# Patient Record
Sex: MALE | Race: WHITE | NOT HISPANIC OR LATINO | Employment: FULL TIME | URBAN - METROPOLITAN AREA
[De-identification: names, ages, dates, MRNs, and addresses within clinical notes are randomized per-mention and may not be internally consistent; named-entity substitution may affect disease eponyms.]

---

## 2018-02-21 ENCOUNTER — CONSULT (OUTPATIENT)
Dept: UROLOGY | Facility: AMBULATORY SURGERY CENTER | Age: 48
End: 2018-02-21
Payer: COMMERCIAL

## 2018-02-21 VITALS
WEIGHT: 168.6 LBS | SYSTOLIC BLOOD PRESSURE: 122 MMHG | BODY MASS INDEX: 23.6 KG/M2 | HEART RATE: 58 BPM | DIASTOLIC BLOOD PRESSURE: 70 MMHG | HEIGHT: 71 IN

## 2018-02-21 DIAGNOSIS — N20.0 CALCULUS OF KIDNEY: Primary | ICD-10-CM

## 2018-02-21 PROCEDURE — 99244 OFF/OP CNSLTJ NEW/EST MOD 40: CPT | Performed by: UROLOGY

## 2018-02-21 RX ORDER — TAMSULOSIN HYDROCHLORIDE 0.4 MG/1
0.4 CAPSULE ORAL
Qty: 14 CAPSULE | Refills: 0 | Status: SHIPPED | OUTPATIENT
Start: 2018-02-21 | End: 2018-03-14

## 2018-02-21 NOTE — PROGRESS NOTES
2/21/2018    Neva Escalante  1970  [id number]        Assessment  Left ureteral calculus and renal calculus    Plan  Patient is tolerating his symptoms reasonably well at this time  No urgent intervention necessary  The we did discuss indications for emergent presentation including fever, severe pain, vomiting  Otherwise he will attempt another 2 weeks of stone passage  I prescribed tamsulosin to assist with this  He understands the importance of hydration and will avoid his tea in the future  He will tentatively schedule left ureteroscopy, laser, stone extraction, stent placement  We discussed the technique, risks, and benefits  Consent was obtained in the office today  He will notify us if he passes the stone  History of Present Illness  Chiqui Contreras is a 52 y o  male with multiple episodes of kidney stones in the past   He has passed these spontaneously  About 2 months ago, he noticed left flank pain which he believes was a stone  Nevertheless it was tolerable so he went on vacation to [place]  He then developed severe pain and went to emergency room on 2/10/2018  CT scan report reveals 6 mm left mid ureteral calculus at the L4-L5 level  He also has nonobstructing 5 mm calculus  He has been doing reasonably well since then  Yesterday he had some mild left groin pain and some dark urine  He continues to drink iced tea  He is also drinking lots of water  He was not prescribed tamsulosin  Review of Systems  Review of Systems   Constitutional: Negative  HENT: Negative  Respiratory: Negative  Cardiovascular: Negative  Gastrointestinal: Negative  Genitourinary:        As per HPI   Musculoskeletal: Negative  Skin: Negative  Neurological: Negative  Hematological: Negative            Past Medical History  Past Medical History:   Diagnosis Date    Crohn disease ([facility] [address] )     Kidney stone        Past Social History  Past Surgical History:   Procedure Laterality Date  ACHILLES TENDON SURGERY  2008    COLON SURGERY  1995       Past Family History  Family History   Problem Relation Age of Onset    Stroke Father     Nephrolithiasis Father     Diabetes Mother     Nephrolithiasis Brother        Past Social history  Social History     Social History    Marital status: /Civil Union     Spouse name: N/A    Number of children: N/A    Years of education: N/A     Occupational History    Not on file  Social History Main Topics    Smoking status: Never Smoker    Smokeless tobacco: Never Used    Alcohol use Yes      Comment: occasional     Drug use: No    Sexual activity: Not on file     Other Topics Concern    Not on file     Social History Narrative    No narrative on file       Current Medications  Current Outpatient Prescriptions   Medication Sig Dispense Refill    tamsulosin (FLOMAX) 0 4 mg Take 1 capsule (0 4 mg total) by mouth daily with dinner 14 capsule 0     No current facility-administered medications for this visit  Allergies  No Known Allergies    Past Medical History, Social History, Family History, medications and allergies were reviewed  Vitals  Vitals:    02/21/18 1514   BP: 122/70   Pulse: 58   Weight: 76 5 kg (168 lb 9 6 oz)   Height: 5' 11" (1 803 m)       Physical Exam  Physical Exam   Constitutional: He is oriented to person, place, and time  He appears well-developed and well-nourished  Cardiovascular: Normal rate and regular rhythm  Pulmonary/Chest: Effort normal and breath sounds normal    Abdominal: Soft  Genitourinary:   Genitourinary Comments: No CVA tenderness   Musculoskeletal: Normal range of motion  Neurological: He is alert and oriented to person, place, and time  Skin: Skin is warm, dry and intact  Psychiatric: He has a normal mood and affect  Vitals reviewed          Results  No results found for: PSA  No results found for: GLUCOSE, CALCIUM, NA, K, CO2, CL, BUN, CREATININE  No results found for: WBC, HGB, HCT, MCV, PLT

## 2018-03-01 ENCOUNTER — ANESTHESIA EVENT (OUTPATIENT)
Dept: PERIOP | Facility: AMBULARY SURGERY CENTER | Age: 48
End: 2018-03-01
Payer: COMMERCIAL

## 2018-03-06 ENCOUNTER — APPOINTMENT (OUTPATIENT)
Dept: LAB | Facility: CLINIC | Age: 48
End: 2018-03-06
Payer: COMMERCIAL

## 2018-03-06 ENCOUNTER — OFFICE VISIT (OUTPATIENT)
Dept: LAB | Facility: CLINIC | Age: 48
End: 2018-03-06
Payer: COMMERCIAL

## 2018-03-06 DIAGNOSIS — N20.0 CALCULUS OF KIDNEY: ICD-10-CM

## 2018-03-06 LAB
ANION GAP SERPL CALCULATED.3IONS-SCNC: 8 MMOL/L (ref 4–13)
APTT PPP: 33 SECONDS (ref 23–35)
BASOPHILS # BLD AUTO: 0.02 THOUSANDS/ΜL (ref 0–0.1)
BASOPHILS NFR BLD AUTO: 1 % (ref 0–1)
BUN SERPL-MCNC: 13 MG/DL (ref 5–25)
CALCIUM SERPL-MCNC: 8.6 MG/DL (ref 8.3–10.1)
CHLORIDE SERPL-SCNC: 103 MMOL/L (ref 100–108)
CO2 SERPL-SCNC: 32 MMOL/L (ref 21–32)
CREAT SERPL-MCNC: 1.13 MG/DL (ref 0.6–1.3)
EOSINOPHIL # BLD AUTO: 0.2 THOUSAND/ΜL (ref 0–0.61)
EOSINOPHIL NFR BLD AUTO: 5 % (ref 0–6)
ERYTHROCYTE [DISTWIDTH] IN BLOOD BY AUTOMATED COUNT: 13.2 % (ref 11.6–15.1)
GFR SERPL CREATININE-BSD FRML MDRD: 77 ML/MIN/1.73SQ M
GLUCOSE SERPL-MCNC: 81 MG/DL (ref 65–140)
HCT VFR BLD AUTO: 42.1 % (ref 36.5–49.3)
HGB BLD-MCNC: 14.3 G/DL (ref 12–17)
INR PPP: 0.98 (ref 0.86–1.16)
LYMPHOCYTES # BLD AUTO: 1.08 THOUSANDS/ΜL (ref 0.6–4.47)
LYMPHOCYTES NFR BLD AUTO: 28 % (ref 14–44)
MCH RBC QN AUTO: 33.3 PG (ref 26.8–34.3)
MCHC RBC AUTO-ENTMCNC: 34 G/DL (ref 31.4–37.4)
MCV RBC AUTO: 98 FL (ref 82–98)
MONOCYTES # BLD AUTO: 0.36 THOUSAND/ΜL (ref 0.17–1.22)
MONOCYTES NFR BLD AUTO: 9 % (ref 4–12)
NEUTROPHILS # BLD AUTO: 2.23 THOUSANDS/ΜL (ref 1.85–7.62)
NEUTS SEG NFR BLD AUTO: 57 % (ref 43–75)
PLATELET # BLD AUTO: 226 THOUSANDS/UL (ref 149–390)
PMV BLD AUTO: 9.1 FL (ref 8.9–12.7)
POTASSIUM SERPL-SCNC: 4.5 MMOL/L (ref 3.5–5.3)
PROTHROMBIN TIME: 13.3 SECONDS (ref 12.1–14.4)
RBC # BLD AUTO: 4.29 MILLION/UL (ref 3.88–5.62)
SODIUM SERPL-SCNC: 143 MMOL/L (ref 136–145)
WBC # BLD AUTO: 3.89 THOUSAND/UL (ref 4.31–10.16)

## 2018-03-06 PROCEDURE — 87086 URINE CULTURE/COLONY COUNT: CPT

## 2018-03-06 PROCEDURE — 93005 ELECTROCARDIOGRAM TRACING: CPT

## 2018-03-06 PROCEDURE — 80048 BASIC METABOLIC PNL TOTAL CA: CPT

## 2018-03-06 PROCEDURE — 85025 COMPLETE CBC W/AUTO DIFF WBC: CPT

## 2018-03-06 PROCEDURE — 85730 THROMBOPLASTIN TIME PARTIAL: CPT

## 2018-03-06 PROCEDURE — 85610 PROTHROMBIN TIME: CPT

## 2018-03-06 PROCEDURE — 36415 COLL VENOUS BLD VENIPUNCTURE: CPT

## 2018-03-06 NOTE — PRE-PROCEDURE INSTRUCTIONS
Pre-Surgery Instructions:   Medication Instructions    tamsulosin (FLOMAX) 0 4 mg Instructed patient per Anesthesia Guidelines  Pre op and bathing instructions reviewed

## 2018-03-07 LAB
ATRIAL RATE: 60 BPM
BACTERIA UR CULT: ABNORMAL
P AXIS: 51 DEGREES
PR INTERVAL: 134 MS
QRS AXIS: 42 DEGREES
QRSD INTERVAL: 94 MS
QT INTERVAL: 396 MS
QTC INTERVAL: 396 MS
T WAVE AXIS: 24 DEGREES
VENTRICULAR RATE: 60 BPM

## 2018-03-07 PROCEDURE — 93010 ELECTROCARDIOGRAM REPORT: CPT | Performed by: INTERNAL MEDICINE

## 2018-03-14 ENCOUNTER — HOSPITAL ENCOUNTER (EMERGENCY)
Facility: HOSPITAL | Age: 48
Discharge: HOME | End: 2018-03-14
Attending: EMERGENCY MEDICINE | Admitting: EMERGENCY MEDICINE
Payer: COMMERCIAL

## 2018-03-14 ENCOUNTER — ANESTHESIA (OUTPATIENT)
Dept: PERIOP | Facility: AMBULARY SURGERY CENTER | Age: 48
End: 2018-03-14
Payer: COMMERCIAL

## 2018-03-14 ENCOUNTER — HOSPITAL ENCOUNTER (OUTPATIENT)
Facility: AMBULARY SURGERY CENTER | Age: 48
Setting detail: OUTPATIENT SURGERY
Discharge: HOME | End: 2018-03-14
Attending: UROLOGY | Admitting: UROLOGY
Payer: COMMERCIAL

## 2018-03-14 ENCOUNTER — APPOINTMENT (OUTPATIENT)
Dept: RADIOLOGY | Facility: AMBULARY SURGERY CENTER | Age: 48
End: 2018-03-14
Payer: COMMERCIAL

## 2018-03-14 ENCOUNTER — TELEPHONE (OUTPATIENT)
Dept: UROLOGY | Facility: AMBULATORY SURGERY CENTER | Age: 48
End: 2018-03-14

## 2018-03-14 VITALS
RESPIRATION RATE: 16 BRPM | SYSTOLIC BLOOD PRESSURE: 111 MMHG | DIASTOLIC BLOOD PRESSURE: 71 MMHG | BODY MASS INDEX: 23.52 KG/M2 | HEART RATE: 73 BPM | OXYGEN SATURATION: 97 % | WEIGHT: 168 LBS | HEIGHT: 71 IN | TEMPERATURE: 98.2 F

## 2018-03-14 VITALS
RESPIRATION RATE: 16 BRPM | BODY MASS INDEX: 23.59 KG/M2 | TEMPERATURE: 97.4 F | DIASTOLIC BLOOD PRESSURE: 84 MMHG | HEART RATE: 56 BPM | OXYGEN SATURATION: 98 % | HEIGHT: 71 IN | SYSTOLIC BLOOD PRESSURE: 120 MMHG | WEIGHT: 168.5 LBS

## 2018-03-14 DIAGNOSIS — N20.0 CALCULUS OF KIDNEY: Primary | ICD-10-CM

## 2018-03-14 DIAGNOSIS — N20.0 NEPHROLITHIASIS: Primary | ICD-10-CM

## 2018-03-14 DIAGNOSIS — R10.9 LEFT FLANK PAIN: Primary | ICD-10-CM

## 2018-03-14 LAB
ANION GAP SERPL CALCULATED.3IONS-SCNC: 7 MMOL/L (ref 4–13)
BACTERIA UR QL AUTO: ABNORMAL /HPF
BILIRUB UR QL STRIP: NEGATIVE
BUN SERPL-MCNC: 13 MG/DL (ref 5–25)
CALCIUM SERPL-MCNC: 9.1 MG/DL (ref 8.3–10.1)
CHLORIDE SERPL-SCNC: 104 MMOL/L (ref 100–108)
CLARITY UR: ABNORMAL
CO2 SERPL-SCNC: 29 MMOL/L (ref 21–32)
COLOR UR: ABNORMAL
CREAT SERPL-MCNC: 1.18 MG/DL (ref 0.6–1.3)
GFR SERPL CREATININE-BSD FRML MDRD: 73 ML/MIN/1.73SQ M
GLUCOSE SERPL-MCNC: 130 MG/DL (ref 65–140)
GLUCOSE UR STRIP-MCNC: NEGATIVE MG/DL
HGB UR QL STRIP.AUTO: ABNORMAL
KETONES UR STRIP-MCNC: NEGATIVE MG/DL
LEUKOCYTE ESTERASE UR QL STRIP: ABNORMAL
NITRITE UR QL STRIP: NEGATIVE
NON-SQ EPI CELLS URNS QL MICRO: ABNORMAL /HPF
PH UR STRIP.AUTO: 6.5 [PH] (ref 4.5–8)
POTASSIUM SERPL-SCNC: 4.3 MMOL/L (ref 3.5–5.3)
PROT UR STRIP-MCNC: ABNORMAL MG/DL
RBC #/AREA URNS AUTO: ABNORMAL /HPF
SODIUM SERPL-SCNC: 140 MMOL/L (ref 136–145)
SP GR UR STRIP.AUTO: 1.02 (ref 1–1.03)
UROBILINOGEN UR QL STRIP.AUTO: 0.2 E.U./DL
WBC #/AREA URNS AUTO: ABNORMAL /HPF

## 2018-03-14 PROCEDURE — 52356 CYSTO/URETERO W/LITHOTRIPSY: CPT | Performed by: UROLOGY

## 2018-03-14 PROCEDURE — 99283 EMERGENCY DEPT VISIT LOW MDM: CPT

## 2018-03-14 PROCEDURE — 82360 CALCULUS ASSAY QUANT: CPT | Performed by: UROLOGY

## 2018-03-14 PROCEDURE — 96375 TX/PRO/DX INJ NEW DRUG ADDON: CPT

## 2018-03-14 PROCEDURE — C2617 STENT, NON-COR, TEM W/O DEL: HCPCS | Performed by: UROLOGY

## 2018-03-14 PROCEDURE — 88300 SURGICAL PATH GROSS: CPT | Performed by: PATHOLOGY

## 2018-03-14 PROCEDURE — C1758 CATHETER, URETERAL: HCPCS | Performed by: UROLOGY

## 2018-03-14 PROCEDURE — 74420 UROGRAPHY RTRGR +-KUB: CPT

## 2018-03-14 PROCEDURE — 96374 THER/PROPH/DIAG INJ IV PUSH: CPT

## 2018-03-14 PROCEDURE — 80048 BASIC METABOLIC PNL TOTAL CA: CPT | Performed by: EMERGENCY MEDICINE

## 2018-03-14 PROCEDURE — 81001 URINALYSIS AUTO W/SCOPE: CPT | Performed by: EMERGENCY MEDICINE

## 2018-03-14 PROCEDURE — 36415 COLL VENOUS BLD VENIPUNCTURE: CPT | Performed by: EMERGENCY MEDICINE

## 2018-03-14 PROCEDURE — C1769 GUIDE WIRE: HCPCS | Performed by: UROLOGY

## 2018-03-14 PROCEDURE — 96376 TX/PRO/DX INJ SAME DRUG ADON: CPT

## 2018-03-14 DEVICE — STENT URETERAL 6 FR 26CM INLAY OPTIMA: Type: IMPLANTABLE DEVICE | Site: URETER | Status: FUNCTIONAL

## 2018-03-14 RX ORDER — ONDANSETRON 2 MG/ML
4 INJECTION INTRAMUSCULAR; INTRAVENOUS ONCE AS NEEDED
Status: DISCONTINUED | OUTPATIENT
Start: 2018-03-14 | End: 2018-03-14 | Stop reason: HOSPADM

## 2018-03-14 RX ORDER — FENTANYL CITRATE 50 UG/ML
INJECTION, SOLUTION INTRAMUSCULAR; INTRAVENOUS AS NEEDED
Status: DISCONTINUED | OUTPATIENT
Start: 2018-03-14 | End: 2018-03-14 | Stop reason: SURG

## 2018-03-14 RX ORDER — FENTANYL CITRATE/PF 50 MCG/ML
50 SYRINGE (ML) INJECTION
Status: DISCONTINUED | OUTPATIENT
Start: 2018-03-14 | End: 2018-03-14 | Stop reason: HOSPADM

## 2018-03-14 RX ORDER — KETOROLAC TROMETHAMINE 30 MG/ML
10 INJECTION, SOLUTION INTRAMUSCULAR; INTRAVENOUS ONCE
Status: COMPLETED | OUTPATIENT
Start: 2018-03-14 | End: 2018-03-14

## 2018-03-14 RX ORDER — OXYCODONE HYDROCHLORIDE 5 MG/1
5 CAPSULE ORAL EVERY 6 HOURS PRN
Qty: 15 CAPSULE | Refills: 0 | Status: SHIPPED | OUTPATIENT
Start: 2018-03-14

## 2018-03-14 RX ORDER — PROPOFOL 10 MG/ML
INJECTION, EMULSION INTRAVENOUS AS NEEDED
Status: DISCONTINUED | OUTPATIENT
Start: 2018-03-14 | End: 2018-03-14 | Stop reason: SURG

## 2018-03-14 RX ORDER — METOCLOPRAMIDE HYDROCHLORIDE 5 MG/ML
10 INJECTION INTRAMUSCULAR; INTRAVENOUS ONCE AS NEEDED
Status: DISCONTINUED | OUTPATIENT
Start: 2018-03-14 | End: 2018-03-14 | Stop reason: HOSPADM

## 2018-03-14 RX ORDER — LIDOCAINE HYDROCHLORIDE 10 MG/ML
INJECTION, SOLUTION INFILTRATION; PERINEURAL AS NEEDED
Status: DISCONTINUED | OUTPATIENT
Start: 2018-03-14 | End: 2018-03-14 | Stop reason: SURG

## 2018-03-14 RX ORDER — SODIUM CHLORIDE 9 MG/ML
INJECTION, SOLUTION INTRAVENOUS
Status: DISCONTINUED | OUTPATIENT
Start: 2018-03-14 | End: 2018-03-14 | Stop reason: HOSPADM

## 2018-03-14 RX ORDER — OXYBUTYNIN CHLORIDE 10 MG/1
10 TABLET, EXTENDED RELEASE ORAL DAILY
Qty: 10 TABLET | Refills: 0 | Status: SHIPPED | OUTPATIENT
Start: 2018-03-14 | End: 2019-01-07

## 2018-03-14 RX ORDER — MORPHINE SULFATE 10 MG/ML
8 INJECTION, SOLUTION INTRAMUSCULAR; INTRAVENOUS ONCE
Status: COMPLETED | OUTPATIENT
Start: 2018-03-14 | End: 2018-03-14

## 2018-03-14 RX ORDER — ONDANSETRON 2 MG/ML
INJECTION INTRAMUSCULAR; INTRAVENOUS AS NEEDED
Status: DISCONTINUED | OUTPATIENT
Start: 2018-03-14 | End: 2018-03-14 | Stop reason: SURG

## 2018-03-14 RX ORDER — MAGNESIUM HYDROXIDE 1200 MG/15ML
LIQUID ORAL AS NEEDED
Status: DISCONTINUED | OUTPATIENT
Start: 2018-03-14 | End: 2018-03-14 | Stop reason: HOSPADM

## 2018-03-14 RX ORDER — CIPROFLOXACIN 500 MG/1
500 TABLET, FILM COATED ORAL 2 TIMES DAILY
Qty: 10 TABLET | Refills: 0 | Status: SHIPPED | OUTPATIENT
Start: 2018-03-14 | End: 2018-03-19

## 2018-03-14 RX ORDER — ONDANSETRON 4 MG/1
8 TABLET, ORALLY DISINTEGRATING ORAL EVERY 8 HOURS PRN
Qty: 10 TABLET | Refills: 0 | Status: SHIPPED | OUTPATIENT
Start: 2018-03-14

## 2018-03-14 RX ORDER — OXYCODONE HYDROCHLORIDE AND ACETAMINOPHEN 5; 325 MG/1; MG/1
2 TABLET ORAL ONCE
Status: COMPLETED | OUTPATIENT
Start: 2018-03-14 | End: 2018-03-14

## 2018-03-14 RX ORDER — MIDAZOLAM HYDROCHLORIDE 1 MG/ML
INJECTION INTRAMUSCULAR; INTRAVENOUS AS NEEDED
Status: DISCONTINUED | OUTPATIENT
Start: 2018-03-14 | End: 2018-03-14 | Stop reason: SURG

## 2018-03-14 RX ADMIN — OXYCODONE HYDROCHLORIDE AND ACETAMINOPHEN 2 TABLET: 5; 325 TABLET ORAL at 22:00

## 2018-03-14 RX ADMIN — CEFAZOLIN SODIUM 1000 MG: 1 SOLUTION INTRAVENOUS at 13:11

## 2018-03-14 RX ADMIN — MORPHINE SULFATE 8 MG: 10 INJECTION INTRAVENOUS at 19:42

## 2018-03-14 RX ADMIN — MIDAZOLAM HYDROCHLORIDE 2 MG: 1 INJECTION, SOLUTION INTRAMUSCULAR; INTRAVENOUS at 13:03

## 2018-03-14 RX ADMIN — DEXAMETHASONE SODIUM PHOSPHATE 4 MG: 10 INJECTION INTRAMUSCULAR; INTRAVENOUS at 13:17

## 2018-03-14 RX ADMIN — MORPHINE SULFATE 8 MG: 10 INJECTION INTRAVENOUS at 18:51

## 2018-03-14 RX ADMIN — LIDOCAINE HYDROCHLORIDE 50 MG: 10 INJECTION, SOLUTION INFILTRATION; PERINEURAL at 13:06

## 2018-03-14 RX ADMIN — PROPOFOL 200 MG: 10 INJECTION, EMULSION INTRAVENOUS at 13:06

## 2018-03-14 RX ADMIN — ONDANSETRON 4 MG: 2 INJECTION INTRAMUSCULAR; INTRAVENOUS at 13:48

## 2018-03-14 RX ADMIN — KETOROLAC TROMETHAMINE 9.9 MG: 30 INJECTION, SOLUTION INTRAMUSCULAR at 18:51

## 2018-03-14 RX ADMIN — FENTANYL CITRATE 100 MCG: 50 INJECTION, SOLUTION INTRAMUSCULAR; INTRAVENOUS at 13:05

## 2018-03-14 NOTE — ANESTHESIA PREPROCEDURE EVALUATION
Review of Systems/Medical History  Patient summary reviewed  Chart reviewed  No history of anesthetic complications     Cardiovascular  Negative cardio ROS    Pulmonary  Negative pulmonary ROS        GI/Hepatic      Comment: Crohns     Kidney stones,        Endo/Other     GYN       Hematology   Musculoskeletal       Neurology   Psychology           Physical Exam    Airway    Mallampati score: II  TM Distance: >3 FB  Neck ROM: full     Dental       Cardiovascular  Comment: Negative ROS,     Pulmonary      Other Findings        Anesthesia Plan  ASA Score- 2     Anesthesia Type- general with ASA Monitors  Additional Monitors:   Airway Plan: LMA  Plan Factors-    Induction- intravenous  Postoperative Plan-     Informed Consent- Anesthetic plan and risks discussed with patient  I personally reviewed this patient with the CRNA  Discussed and agreed on the Anesthesia Plan with the ROBEL Washington

## 2018-03-14 NOTE — TELEPHONE ENCOUNTER
Patient called with severe stent pain  Patient had stent placed today  Patient instructed to take 600 mg Ibuprofen every 8 hours and use a heating pad for patient  Patient also instructed to make sure he is drinking plenty of water  Patient states he is not sure he wants to drink anything  Patient states he has such severe pain with urination and does not want to drink because he is afraid of needing to void  Script for Oxybuytnin 10 mg daily sent to [facility] [address] per Dr Mami Yang  Patient instructed if pain become too intolerable over night, he needs to go to the Emergency Room    Patient is to have stent with string removed on Monday 3/19/18 per Dr Alexus Baer  Offered to schedule patient today, patient declined, would like a call tomorrow to schedule  Patient last seen in [facility]

## 2018-03-14 NOTE — OP NOTE
OPERATIVE REPORT  PATIENT NAME: Emilie Luna    :  1970  MRN: [id number]  Pt Location: [facility] OR ROOM 05    SURGERY DATE: 3/14/2018    Surgeon(s) and Role:     Hector Almonte MD - Primary    Preop Diagnosis:  Calculus of kidney [N20 0]    Post-Op Diagnosis Codes:     * Calculus of kidney [N20 0]    Procedure(s) (LRB):  CYSTOSCOPY URETEROSCOPY WITH LITHOTRIPSY HOLMIUM LASER, RETROGRADE PYELOGRAM AND INSERTION STENT URETERAL (Left)    Specimen(s):  ID Type Source Tests Collected by Time Destination   1 :  Calculus Kidney, Left STONE ANALYSIS, TISSUE EXAM Fuad Elkins MD 3/14/2018 1321        Estimated Blood Loss:   Minimal    Drains:       Anesthesia Type:   General    Operative Indications:  Calculus of kidney [N20 0]      Operative Findings:  Ureteral calculus and small renal calculus    Complications:   None    Procedure and Technique:  The patient was identified, brought to the operating room, and placed on the table in supine position  After induction of general anesthesia, the patient was placed in dorsal lithotomy position and prepped and draped in the usual sterile fashion  A complete formal timeout was performed  The 25 Tajik rigid cystoscope was placed per urethra and cystoscopy was performed  There was no bladder abnormality identified  The Left ureteral orifice was identified and cannulated with a sensor wire  A semi rigid ureteral scope was placed and the stone was noted in the distal ureter  A holmium laser fiber was placed and laser lithotripsy was performed  When the stone was of suitable size, a zero tip stone basket was placed and the stone fragments were retrieved  A dual-lumen catheter was then placed followed by a second guidewire and 5 3 Tajik flexible ureteroscope  A small adherent renal stone was identified within a mid pole renal calyx  This was grasped with a Zero tip basket and retrieved    At this point, a retrograde pyelogram was performed delineating the upper urinary tract anatomy  No further filling defect identified  The safety wire was backloaded into the cystoscope and a 26 cm x 6 Tajik double-J stent was placed with string  The patient tolerated the procedure well and was transferred to the recovery room awake alert and in stable condition       I was present for the entire procedure    Patient Disposition:  PACU     SIGNATURE: Rito Scanlon MD  DATE: March 14, 2018  TIME: 1:49 PM

## 2018-03-14 NOTE — H&P (VIEW-ONLY)
2/21/2018    Chiquis Feeling  1970  [id number]        Assessment  Left ureteral calculus and renal calculus    Plan  Patient is tolerating his symptoms reasonably well at this time  No urgent intervention necessary  The we did discuss indications for emergent presentation including fever, severe pain, vomiting  Otherwise he will attempt another 2 weeks of stone passage  I prescribed tamsulosin to assist with this  He understands the importance of hydration and will avoid his tea in the future  He will tentatively schedule left ureteroscopy, laser, stone extraction, stent placement  We discussed the technique, risks, and benefits  Consent was obtained in the office today  He will notify us if he passes the stone  History of Present Illness  Cassy Rubio is a 52 y o  male with multiple episodes of kidney stones in the past   He has passed these spontaneously  About 2 months ago, he noticed left flank pain which he believes was a stone  Nevertheless it was tolerable so he went on vacation to [place]  He then developed severe pain and went to emergency room on 2/10/2018  CT scan report reveals 6 mm left mid ureteral calculus at the L4-L5 level  He also has nonobstructing 5 mm calculus  He has been doing reasonably well since then  Yesterday he had some mild left groin pain and some dark urine  He continues to drink iced tea  He is also drinking lots of water  He was not prescribed tamsulosin  Review of Systems  Review of Systems   Constitutional: Negative  HENT: Negative  Respiratory: Negative  Cardiovascular: Negative  Gastrointestinal: Negative  Genitourinary:        As per HPI   Musculoskeletal: Negative  Skin: Negative  Neurological: Negative  Hematological: Negative            Past Medical History  Past Medical History:   Diagnosis Date    Crohn disease ([facility] [address] )     Kidney stone        Past Social History  Past Surgical History:   Procedure Laterality Date  ACHILLES TENDON SURGERY  2008    COLON SURGERY  1995       Past Family History  Family History   Problem Relation Age of Onset    Stroke Father     Nephrolithiasis Father     Diabetes Mother     Nephrolithiasis Brother        Past Social history  Social History     Social History    Marital status: /Civil Union     Spouse name: N/A    Number of children: N/A    Years of education: N/A     Occupational History    Not on file  Social History Main Topics    Smoking status: Never Smoker    Smokeless tobacco: Never Used    Alcohol use Yes      Comment: occasional     Drug use: No    Sexual activity: Not on file     Other Topics Concern    Not on file     Social History Narrative    No narrative on file       Current Medications  Current Outpatient Prescriptions   Medication Sig Dispense Refill    tamsulosin (FLOMAX) 0 4 mg Take 1 capsule (0 4 mg total) by mouth daily with dinner 14 capsule 0     No current facility-administered medications for this visit  Allergies  No Known Allergies    Past Medical History, Social History, Family History, medications and allergies were reviewed  Vitals  Vitals:    02/21/18 1514   BP: 122/70   Pulse: 58   Weight: 76 5 kg (168 lb 9 6 oz)   Height: 5' 11" (1 803 m)       Physical Exam  Physical Exam   Constitutional: He is oriented to person, place, and time  He appears well-developed and well-nourished  Cardiovascular: Normal rate and regular rhythm  Pulmonary/Chest: Effort normal and breath sounds normal    Abdominal: Soft  Genitourinary:   Genitourinary Comments: No CVA tenderness   Musculoskeletal: Normal range of motion  Neurological: He is alert and oriented to person, place, and time  Skin: Skin is warm, dry and intact  Psychiatric: He has a normal mood and affect  Vitals reviewed          Results  No results found for: PSA  No results found for: GLUCOSE, CALCIUM, NA, K, CO2, CL, BUN, CREATININE  No results found for: WBC, HGB, HCT, MCV, PLT

## 2018-03-14 NOTE — ED PROVIDER NOTES
History  Chief Complaint   Patient presents with    Painful Urination     Pt presents to ED due to c/o pain with urination  Had a stent place in LEFT ureter today  Only pain medication for home is diclofenac      52  Male earlier today as outpt  Had cystoscopy / lithotripsy and left ureteral setnt insertion - when got home urinated and had left flank pain with hematuria-- no fevers/ vomiting-- no other comps         History provided by:  Patient and spouse   used: No        Prior to Admission Medications   Prescriptions Last Dose Informant Patient Reported? Taking?   ciprofloxacin (CIPRO) 500 mg tablet   No Yes   Sig: Take 1 tablet (500 mg total) by mouth 2 (two) times a day for 5 days   diclofenac sodium (VOLTAREN) 50 mg EC tablet   Yes Yes   Sig: Take 50 mg by mouth daily as needed   oxybutynin (DITROPAN-XL) 10 MG 24 hr tablet   No Yes   Sig: Take 1 tablet (10 mg total) by mouth daily for 10 days      Facility-Administered Medications: None       Past Medical History:   Diagnosis Date    Crohn disease ([address] )     resolved    Kidney stone        Past Surgical History:   Procedure Laterality Date    ACHILLES TENDON SURGERY  2008 [address] [facility]    for crohns dx    URETERAL STENT PLACEMENT Left        Family History   Problem Relation Age of Onset    Stroke Father     Nephrolithiasis Father     Diabetes Mother     Nephrolithiasis Brother      I have reviewed and agree with the history as documented  Social History   Substance Use Topics    Smoking status: Never Smoker    Smokeless tobacco: Never Used    Alcohol use 3 6 oz/week     6 Cans of beer per week      Comment: per week        Review of Systems   Constitutional: Negative  HENT: Negative  Eyes: Negative  Respiratory: Negative  Cardiovascular: Negative  Gastrointestinal: Negative  Endocrine: Negative  Genitourinary: Positive for dysuria, flank pain and hematuria   Negative for decreased urine volume, difficulty urinating, discharge, enuresis, frequency, genital sores, penile pain, penile swelling, scrotal swelling, testicular pain and urgency  Musculoskeletal: Negative for arthralgias, back pain, gait problem, joint swelling, myalgias, neck pain and neck stiffness  Skin: Negative  Allergic/Immunologic: Negative  Neurological: Negative  Hematological: Negative  Psychiatric/Behavioral: Negative  Physical Exam  ED Triage Vitals   Temperature Pulse Respirations Blood Pressure SpO2   03/14/18 1827 03/14/18 1718 03/14/18 1718 03/14/18 1718 03/14/18 1718   98 2 °F (36 8 °C) 75 16 114/81 98 %      Temp Source Heart Rate Source Patient Position - Orthostatic VS BP Location FiO2 (%)   03/14/18 1718 03/14/18 1718 03/14/18 1718 03/14/18 1718 --   Oral Monitor Sitting Left arm       Pain Score       03/14/18 1718       6           Orthostatic Vital Signs  Vitals:    03/14/18 1718   BP: 114/81   Pulse: 75   Patient Position - Orthostatic VS: Sitting       Physical Exam   Constitutional: He is oriented to person, place, and time  He appears well-developed and well-nourished  He appears distressed  avss-- pulse ox 97 % on ra- intepretation is normal- no intervention    HENT:   Head: Normocephalic and atraumatic  Eyes: Conjunctivae and EOM are normal  Pupils are equal, round, and reactive to light  Right eye exhibits no discharge  Left eye exhibits no discharge  No scleral icterus  Mm pink   Neck: Normal range of motion  Neck supple  No JVD present  No tracheal deviation present  No thyromegaly present  Cardiovascular: Normal rate, regular rhythm, normal heart sounds and intact distal pulses  Exam reveals no gallop and no friction rub  No murmur heard  Pulmonary/Chest: Effort normal and breath sounds normal  No stridor  No respiratory distress  He has no wheezes  He has no rales  He exhibits no tenderness  Abdominal: Soft  Bowel sounds are normal  He exhibits no distension and no mass   There is tenderness  There is no rebound and no guarding  No hernia  Left flank tendenress - no peritoneal signs   Musculoskeletal: Normal range of motion  He exhibits no edema, tenderness or deformity  Lymphadenopathy:     He has no cervical adenopathy  Neurological: He is alert and oriented to person, place, and time  No cranial nerve deficit or sensory deficit  He exhibits normal muscle tone  Coordination normal    Skin: Skin is warm  Capillary refill takes less than 2 seconds  No rash noted  He is not diaphoretic  No erythema  No pallor  Psychiatric: He has a normal mood and affect  His behavior is normal  Judgment and thought content normal    Nursing note and vitals reviewed  ED Medications  Medications   morphine (PF) 10 mg/mL injection 8 mg (8 mg Intravenous Given 3/14/18 1851)   ketorolac (TORADOL) injection 9 9 mg (9 9 mg Intravenous Given 3/14/18 1851)   morphine (PF) 10 mg/mL injection 8 mg (8 mg Intravenous Given 3/14/18 1942)       Diagnostic Studies  Results Reviewed     Procedure Component Value Units Date/Time    Basic metabolic panel [[id number]] Collected:  03/14/18 1855    Lab Status:  Final result Specimen:  Blood from Arm, Right Updated:  03/14/18 1925     Sodium 140 mmol/L      Potassium 4 3 mmol/L      Chloride 104 mmol/L      CO2 29 mmol/L      Anion Gap 7 mmol/L      BUN 13 mg/dL      Creatinine 1 18 mg/dL      Glucose 130 mg/dL      Calcium 9 1 mg/dL      eGFR 73 ml/min/1 73sq m     Narrative:         National Kidney Disease Education Program recommendations are as follows:  GFR calculation is accurate only with a steady state creatinine  Chronic Kidney disease less than 60 ml/min/1 73 sq  meters  Kidney failure less than 15 ml/min/1 73 sq  meters      Urinalysis with microscopic [[id number]]     Lab Status:  No result Specimen:  Urine                  No orders to display              Procedures  Procedures       Phone Contacts  ED Phone Contact    ED Course  ED Course as of Mar 14 2137 Wed Mar 14, 2018   2006 - er md note- pt- re-evaluated  feels improved with 2nd dose of iv morphine-- will re-eval- aware of waiting for urine    2101 - er md - pt - re-evaluated- feels improved- will d/c  in near future- pt wants to urinate and see how pain is prior to er d/c    2136  Er md note- pt urinated- decreased pain feels comfortable going home will d/c  with additoinal pain/nausea/ meds    2136 Er md note- bladder scan results reviewed by er md                                MDM  CritCare Time    Disposition  Final diagnoses:   None     ED Disposition     None      Follow-up Information    None       Patient's Medications   Discharge Prescriptions    No medications on file     No discharge procedures on file      ED Provider  Electronically Signed by           Adrian Villalba MD  03/16/18 5011

## 2018-03-14 NOTE — ED NOTES
Dr Jodie Hyman placed stent this morning   PT called MD castorena, told to take his pain medication       Aby Shell RN  03/14/18 3547

## 2018-03-14 NOTE — ANESTHESIA POSTPROCEDURE EVALUATION
Post-Op Assessment Note      CV Status:  Stable    Mental Status:  Alert and awake    Hydration Status:  Euvolemic    PONV Controlled:  Controlled    Airway Patency:  Patent    Post Op Vitals Reviewed: Yes          Staff: CRNA           BP (P) 108/72 (03/14/18 1356)    Temp (P) 98 °F (36 7 °C) (03/14/18 1356)    Pulse (P) 65 (03/14/18 1356)   Resp (P) 18 (03/14/18 1356)    SpO2 (P) 98 % (03/14/18 1356)

## 2018-03-14 NOTE — DISCHARGE INSTRUCTIONS
Kidney Stones   WHAT YOU NEED TO KNOW:   Kidney stones form in the urinary system when the water and waste in your urine are out of balance  When this happens, certain types of waste crystals separate from the urine  The crystals build up and form kidney stones  You may have 1 or more kidney stones  DISCHARGE INSTRUCTIONS:   Return to the emergency department if:   · You have vomiting that is not relieved by medicine  Contact your healthcare provider if:   · You have a fever  · You have trouble passing urine  · You see blood in your urine  · You have severe pain  · You have any questions or concerns about your condition or care  Medicines:   · NSAIDs , such as ibuprofen, help decrease swelling, pain, and fever  This medicine is available with or without a doctor's order  NSAIDs can cause stomach bleeding or kidney problems in certain people  If you take blood thinner medicine, always ask your healthcare provider if NSAIDs are safe for you  Always read the medicine label and follow directions  · Prescription medicine  may be given  Ask how to take this medicine safely  · Medicines  to balance your electrolytes may be needed  · Take your medicine as directed  Contact your healthcare provider if you think your medicine is not helping or if you have side effects  Tell him or her if you are allergic to any medicine  Keep a list of the medicines, vitamins, and herbs you take  Include the amounts, and when and why you take them  Bring the list or the pill bottles to follow-up visits  Carry your medicine list with you in case of an emergency  Follow up with your healthcare provider as directed: You may need to return for more tests  Write down your questions so you remember to ask them during your visits  Self-care:   · Drink plenty of liquids  Your healthcare provider may tell you to drink at least 8 to 12 (eight-ounce) cups of liquids each day   This helps flush out the kidney stones when you urinate  Water is the best liquid to drink  · Strain your urine every time you go to the bathroom  Urinate through a strainer or a piece of thin cloth to catch the stones  Take the stones to your healthcare provider so they can be sent to the lab for tests  This will help your healthcare providers plan the best treatment for you  · Eat a variety of healthy foods  Healthy foods include fruits, vegetables, whole-grain breads, low-fat dairy products, beans, and fish  You may need to limit how much sodium (salt) or protein you eat  Ask for information about the best foods for you  · Stay active  Your stones may pass more easily by if you stay active  Ask about the best activities for you  After you pass your kidney stones:  Once you have passed your kidney stones, your healthcare provider may  order a 24-hour urine test  Results from a 24-hour urine test will help your healthcare provider plan ways to prevent more stones from forming  If you are told to do a 24-hour test, your healthcare provider will give you more instructions  © 2017 [facility] Information is for End User's use only and may not be sold, redistributed or otherwise used for commercial purposes  All illustrations and images included in CareNotes® are the copyrighted property of [employer]  or [employer]  The above information is an  only  It is not intended as medical advice for individual conditions or treatments  Talk to your doctor, nurse or pharmacist before following any medical regimen to see if it is safe and effective for you  Kidney Stones   AMBULATORY CARE:   Kidney stones  form in the urinary system when the water and waste in your urine are out of balance  When this happens, certain types of waste crystals separate from the urine  The crystals build up and form kidney stones  Kidney stones can be made of uric acid, calcium, phosphate, or oxalate crystals   You may have 1 or more kidney stones  Common symptoms include the following:   · Pain in the middle of your back that moves across to your side or that may spread to your groin    · Nausea and vomiting    · Urge to urinate often, burning feeling when you urinate, or pink or red urine    · Tenderness in your lower back, side, or stomach  Seek care immediately if:   · You have vomiting that is not relieved by medicine  Contact your healthcare provider if:   · You have a fever  · You have trouble passing urine  · You see blood in your urine  · You have severe pain  · You have any questions or concerns about your condition or care  Treatment for kidney stones  may include any of the following:  · NSAIDs , such as ibuprofen, help decrease swelling, pain, and fever  This medicine is available with or without a doctor's order  NSAIDs can cause stomach bleeding or kidney problems in certain people  If you take blood thinner medicine, always ask your healthcare provider if NSAIDs are safe for you  Always read the medicine label and follow directions  · Prescription medicine  may be given  Ask how to take this medicine safely  · Medicines  to balance your electrolytes may be needed  · A procedure or surgery  to remove the kidney stones may be needed if they do not pass on their own  Your treatment will depend on the size and location of your kidney stones  Manage your symptoms:   · Drink plenty of liquids  Your healthcare provider may tell you to drink at least 8 to 12 (eight-ounce) cups of liquids each day  This helps flush out the kidney stones when you urinate  Water is the best liquid to drink  · Strain your urine every time you go to the bathroom  Urinate through a strainer or a piece of thin cloth to catch the stones  Take the stones to your healthcare provider so they can be sent to the lab for tests  This will help your healthcare providers plan the best treatment for you  · Eat a variety of healthy foods  Healthy foods include fruits, vegetables, whole-grain breads, low-fat dairy products, beans, and fish  You may need to limit how much sodium (salt) or protein you eat  Ask for information about the best foods for you  · Exercise regularly  Your stones may pass more easily if you stay active  Ask about the best activities for you  After you pass your kidney stones:  Once you have passed your kidney stones, your healthcare provider may  order a 24-hour urine test  Results from a 24-hour urine test will help your healthcare provider plan ways to prevent more stones from forming  If you are told to do a 24-hour test, your healthcare provider will give you more instructions  Follow up with your healthcare provider as directed:  Write down your questions so you remember to ask them during your visits  © 2017 [address] [employer] Information is for End User's use only and may not be sold, redistributed or otherwise used for commercial purposes  All illustrations and images included in CareNotes® are the copyrighted property of [employer]  or [employer] San[employer]  The above information is an  only  It is not intended as medical advice for individual conditions or treatments  Talk to your doctor, nurse or pharmacist before following any medical regimen to see if it is safe and effective for you

## 2018-03-15 ENCOUNTER — TELEPHONE (OUTPATIENT)
Dept: UROLOGY | Facility: CLINIC | Age: 48
End: 2018-03-15

## 2018-03-15 NOTE — TELEPHONE ENCOUNTER
Patient had called asking if it was ok to take the Oxycodone he had received in the ER last night along with the Cipro he was prescribed post op yesterday  Discussed with JULIAN Liao     Per Elsie Morrell patient can take both, but to not take both at the exact same time, to leave a few hours between each  L/M for patient explaining this and to call back with any questions

## 2018-03-15 NOTE — DISCHARGE INSTRUCTIONS
Diagnosis: left flank pain after left ureteral stent insertion     - expect left flank pain and hematuria     - can take the diclofenac that you already have for pain     - can  the antibiotics- cipro- and anti spasmodic- oxybutynin that were called in for you     - for additonial pain -- oxycodone 1-2 tablets every 4-6 hrs as needed- would; start with 1 tablet every 4-6  hrs as needed     - for nay nausea/ vomiting; zofran 2 tablets dissolve in  The mouth every 6-8 hrs as needed - sent to your pharmacy    - please return to  The er for any fevers- temp > 100 4/ any worsening/ intractable left flank pain or any persistent vomiting or any new/ worsening/concerning symptoms to you

## 2018-03-16 ENCOUNTER — TELEPHONE (OUTPATIENT)
Dept: UROLOGY | Facility: AMBULATORY SURGERY CENTER | Age: 48
End: 2018-03-16

## 2018-03-16 DIAGNOSIS — N20.0 CALCULUS OF KIDNEY: Primary | ICD-10-CM

## 2018-03-16 RX ORDER — PHENAZOPYRIDINE HYDROCHLORIDE 200 MG/1
200 TABLET, FILM COATED ORAL
Qty: 9 TABLET | Refills: 0 | Status: SHIPPED | OUTPATIENT
Start: 2018-03-16

## 2018-03-16 NOTE — TELEPHONE ENCOUNTER
Patient called, concerned if his symptoms that he has been experiencing are normal  Patient reports lower back pain with urination, that resolves shortly after his is done urinating and also reports blood in his urine  Patient aware that these symptoms are common with having a stent  Patient knows to drink plenty of water, is scheduled for stent to be removed on Monday  Discussed with Marry Moser, who ordered pyridium in addition to current prescription of oxybutynin to help with urinary symptoms  Patient aware that pyridium can make urine orange in color  Patient instructed that pyridium can be taken 3 times a day

## 2018-03-19 ENCOUNTER — CLINICAL SUPPORT (OUTPATIENT)
Dept: UROLOGY | Facility: AMBULATORY SURGERY CENTER | Age: 48
End: 2018-03-19
Payer: COMMERCIAL

## 2018-03-19 VITALS
HEIGHT: 71 IN | DIASTOLIC BLOOD PRESSURE: 80 MMHG | SYSTOLIC BLOOD PRESSURE: 132 MMHG | BODY MASS INDEX: 23.97 KG/M2 | WEIGHT: 171.2 LBS | HEART RATE: 62 BPM

## 2018-03-19 DIAGNOSIS — N20.0 KIDNEY STONE: Primary | ICD-10-CM

## 2018-03-19 PROCEDURE — 99211 OFF/OP EST MAY X REQ PHY/QHP: CPT

## 2018-03-23 LAB
CA PHOS MFR STONE: 3 %
COLOR STONE: NORMAL
COM MFR STONE: 97 %
COMMENT-STONE3: NORMAL
COMPOSITION: NORMAL
LABORATORY COMMENT REPORT: NORMAL
NIDUS STONE QL: NORMAL
PHOTO: NORMAL
SIZE STONE: NORMAL MM
STONE ANALYSIS-IMP: NORMAL
WT STONE: 18 MG

## 2019-01-07 ENCOUNTER — OFFICE VISIT (OUTPATIENT)
Dept: URGENT CARE | Age: 49
End: 2019-01-07
Payer: COMMERCIAL

## 2019-01-07 VITALS
BODY MASS INDEX: 24.36 KG/M2 | HEIGHT: 71 IN | DIASTOLIC BLOOD PRESSURE: 72 MMHG | OXYGEN SATURATION: 98 % | HEART RATE: 63 BPM | SYSTOLIC BLOOD PRESSURE: 110 MMHG | WEIGHT: 174 LBS | RESPIRATION RATE: 20 BRPM | TEMPERATURE: 97.4 F

## 2019-01-07 DIAGNOSIS — J40 SINOBRONCHITIS: Primary | ICD-10-CM

## 2019-01-07 DIAGNOSIS — J32.9 SINOBRONCHITIS: Primary | ICD-10-CM

## 2019-01-07 LAB — S PYO AG THROAT QL: NEGATIVE

## 2019-01-07 PROCEDURE — G0382 LEV 3 HOSP TYPE B ED VISIT: HCPCS | Performed by: FAMILY MEDICINE

## 2019-01-07 PROCEDURE — 87430 STREP A AG IA: CPT | Performed by: FAMILY MEDICINE

## 2019-01-07 RX ORDER — CLARITHROMYCIN 500 MG/1
500 TABLET, COATED ORAL EVERY 12 HOURS SCHEDULED
Qty: 20 TABLET | Refills: 0 | Status: SHIPPED | OUTPATIENT
Start: 2019-01-07 | End: 2019-01-17

## 2019-01-07 NOTE — PROGRESS NOTES
[facility]        NAME: Genet Deleon is a 50 y o  male  : 1970    MRN: [id number]  DATE: 2019  TIME: 4:05 PM    Assessment and Plan   Sinobronchitis [J32 9, J40]  1  Sinobronchitis  POCT rapid strepA    clarithromycin (BIAXIN) 500 mg tablet         Patient Instructions       Follow up with PCP in 3-5 days  Proceed to  ER if symptoms worsen  Chief Complaint     Chief Complaint   Patient presents with    Cough     chest tighntess, bilateral ear clogged since 1 week   Sore Throat    Shortness of Breath    Generalized Body Aches    Earache         History of Present Illness       Patient for evaluation of persistent sinus congestion, pressure, cough, chest congestion  Patient coughing up thick yellow-green sputum from time to time  He did have some fevers and body aches as well over the last few days  He denies any dizziness, nausea, vomiting          Review of Systems   Review of Systems      Current Medications       Current Outpatient Prescriptions:     diclofenac sodium (VOLTAREN) 50 mg EC tablet, Take 50 mg by mouth daily as needed, Disp: , Rfl:     ondansetron (ZOFRAN ODT) 4 mg disintegrating tablet, Take 2 tablets (8 mg total) by mouth every 8 (eight) hours as needed for nausea or vomiting for up to 10 doses, Disp: 10 tablet, Rfl: 0    oxybutynin (DITROPAN-XL) 10 MG 24 hr tablet, Take 1 tablet (10 mg total) by mouth daily for 10 days, Disp: 10 tablet, Rfl: 0    oxyCODONE (OXY-IR) 5 MG capsule, Take 1 capsule (5 mg total) by mouth every 6 (six) hours as needed for moderate pain for up to 15 doses Max Daily Amount: 20 mg, Disp: 15 capsule, Rfl: 0    phenazopyridine (PYRIDIUM) 200 mg tablet, Take 1 tablet (200 mg total) by mouth 3 (three) times a day with meals, Disp: 9 tablet, Rfl: 0    clarithromycin (BIAXIN) 500 mg tablet, Take 1 tablet (500 mg total) by mouth every 12 (twelve) hours for 10 days, Disp: 20 tablet, Rfl: 0    Current Allergies     Allergies as of 01/07/2019    (No Known Allergies)            The following portions of the patient's history were reviewed and updated as appropriate: allergies, current medications, past family history, past medical history, past social history, past surgical history and problem list      Past Medical History:   Diagnosis Date    Crohn disease ([address] )     resolved    Kidney stone        Past Surgical History:   Procedure Laterality Date    ACHILLES TENDON SURGERY  2008 [address] [facility]    for crohns dx    MA CYSTO/URETERO W/LITHOTRIPSY &INDWELL STENT INSRT Left 3/14/2018    Procedure: CYSTOSCOPY URETEROSCOPY WITH LITHOTRIPSY HOLMIUM LASER, RETROGRADE PYELOGRAM AND INSERTION STENT URETERAL;  Surgeon: Hyun Go MD;  Location: [facility];  Service: Urology    URETERAL STENT PLACEMENT Left        Family History   Problem Relation Age of Onset    Stroke Father     Nephrolithiasis Father     Diabetes Mother     Nephrolithiasis Brother          Medications have been verified  Objective   /72   Pulse 63   Temp (!) 97 4 °F (36 3 °C) (Temporal)   Resp 20   Ht 5' 11" (1 803 m)   Wt 78 9 kg (174 lb)   SpO2 98%   BMI 24 27 kg/m²        Physical Exam     Physical Exam   Constitutional: He is oriented to person, place, and time  He appears well-developed and well-nourished  No distress  HENT:   Head: Normocephalic and atraumatic  Right Ear: External ear normal    Left Ear: External ear normal    TMs intact bilaterally with clear fluid Lopez bilaterally  No erythema bilaterally  Bilateral nasal congestion erythema with mucopurulent drainage  Bilateral tonsillar erythema with no soft tissue swelling  No exudate  Eyes: Pupils are equal, round, and reactive to light  Conjunctivae and EOM are normal    Pulmonary/Chest: Effort normal  No respiratory distress  He has no wheezes  He has no rales  Coarse breath sounds bilateral upper airway  Lymphadenopathy:     He has cervical adenopathy  Neurological: He is alert and oriented to person, place, and time  Skin: Skin is warm and dry  Psychiatric: He has a normal mood and affect  His behavior is normal    Nursing note and vitals reviewed

## 2021-04-01 DIAGNOSIS — Z23 ENCOUNTER FOR IMMUNIZATION: ICD-10-CM

## 2021-04-26 ENCOUNTER — IMMUNIZATIONS (OUTPATIENT)
Dept: FAMILY MEDICINE CLINIC | Facility: HOSPITAL | Age: 51
End: 2021-04-26

## 2021-04-26 DIAGNOSIS — Z23 ENCOUNTER FOR IMMUNIZATION: Primary | ICD-10-CM

## 2021-04-26 PROCEDURE — 91300 SARS-COV-2 / COVID-19 MRNA VACCINE (PFIZER-BIONTECH) 30 MCG: CPT

## 2021-04-26 PROCEDURE — 0001A SARS-COV-2 / COVID-19 MRNA VACCINE (PFIZER-BIONTECH) 30 MCG: CPT

## 2021-05-20 ENCOUNTER — IMMUNIZATIONS (OUTPATIENT)
Dept: FAMILY MEDICINE CLINIC | Facility: HOSPITAL | Age: 51
End: 2021-05-20

## 2021-05-20 DIAGNOSIS — Z23 ENCOUNTER FOR IMMUNIZATION: Primary | ICD-10-CM

## 2021-05-20 PROCEDURE — 91300 SARS-COV-2 / COVID-19 MRNA VACCINE (PFIZER-BIONTECH) 30 MCG: CPT

## 2021-05-20 PROCEDURE — 0002A SARS-COV-2 / COVID-19 MRNA VACCINE (PFIZER-BIONTECH) 30 MCG: CPT

## (undated) DEVICE — UROCATCH BAG

## (undated) DEVICE — 3M™ TEGADERM™ TRANSPARENT FILM DRESSING FRAME STYLE, 1624W, 2-3/8 IN X 2-3/4 IN (6 CM X 7 CM), 100/CT 4CT/CASE: Brand: 3M™ TEGADERM™

## (undated) DEVICE — SHEATH URETERAL ACCESS 12/14FR 35CM PROXIS

## (undated) DEVICE — 400 MICRON SINGLE-USE HOLMIUM FIBER ASSEMBLY WITH FLAT TIP: Brand: OPTILITE

## (undated) DEVICE — PACK TUR

## (undated) DEVICE — SPECIMEN CONTAINER STERILE PEEL PACK

## (undated) DEVICE — CATH URETERAL 5FR X 70 CM FLEX TIP POLYUR BARD

## (undated) DEVICE — STERILE SURGICAL LUBRICANT,  TUBE: Brand: SURGILUBE

## (undated) DEVICE — BASKET SPECIMEN RETRIVAL 1.9FR 120CM

## (undated) DEVICE — CATH URET .038 10FR 50CM DUAL LUMEN

## (undated) DEVICE — GLOVE SRG BIOGEL ORTHOPEDIC 7.5

## (undated) DEVICE — SCD SEQUENTIAL COMPRESSION COMFORT SLEEVE MEDIUM KNEE LENGTH: Brand: KENDALL SCD

## (undated) DEVICE — GUIDEWIRE STRGHT TIP 0.035 IN  SOLO PLUS